# Patient Record
Sex: MALE | Race: WHITE | Employment: FULL TIME
[De-identification: names, ages, dates, MRNs, and addresses within clinical notes are randomized per-mention and may not be internally consistent; named-entity substitution may affect disease eponyms.]

---

## 2021-09-01 ENCOUNTER — NURSE TRIAGE (OUTPATIENT)
Dept: OTHER | Facility: CLINIC | Age: 41
End: 2021-09-01
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
## 2022-06-27 ENCOUNTER — APPOINTMENT (OUTPATIENT)
Dept: CT IMAGING | Age: 42
End: 2022-06-27
Attending: EMERGENCY MEDICINE
Payer: COMMERCIAL

## 2022-06-27 ENCOUNTER — HOSPITAL ENCOUNTER (OUTPATIENT)
Age: 42
Setting detail: OBSERVATION
Discharge: HOME | End: 2022-06-28
Attending: EMERGENCY MEDICINE | Admitting: INTERNAL MEDICINE
Payer: COMMERCIAL

## 2022-06-27 DIAGNOSIS — R11.2 NAUSEA AND VOMITING, UNSPECIFIED VOMITING TYPE: ICD-10-CM

## 2022-06-27 DIAGNOSIS — R00.0 TACHYCARDIA: Primary | ICD-10-CM

## 2022-06-27 DIAGNOSIS — R10.13 ABDOMINAL PAIN, EPIGASTRIC: ICD-10-CM

## 2022-06-27 DIAGNOSIS — R73.9 HYPERGLYCEMIA: ICD-10-CM

## 2022-06-27 LAB
ALBUMIN SERPL-MCNC: 4.2 G/DL (ref 3.5–5)
ALBUMIN/GLOB SERPL: 0.9 {RATIO} (ref 1.1–2.2)
ALP SERPL-CCNC: 84 U/L (ref 45–117)
ALT SERPL-CCNC: 27 U/L (ref 12–78)
ANION GAP SERPL CALC-SCNC: 17 MMOL/L (ref 5–15)
AST SERPL-CCNC: 10 U/L (ref 15–37)
BILIRUB SERPL-MCNC: 2.1 MG/DL (ref 0.2–1)
BUN SERPL-MCNC: 24 MG/DL (ref 6–20)
BUN/CREAT SERPL: 19 (ref 12–20)
CALCIUM SERPL-MCNC: 10.1 MG/DL (ref 8.5–10.1)
CHLORIDE SERPL-SCNC: 100 MMOL/L (ref 97–108)
CO2 SERPL-SCNC: 22 MMOL/L (ref 21–32)
CREAT SERPL-MCNC: 1.27 MG/DL (ref 0.7–1.3)
ERYTHROCYTE [DISTWIDTH] IN BLOOD BY AUTOMATED COUNT: 13.6 % (ref 11.5–14.5)
GLOBULIN SER CALC-MCNC: 4.5 G/DL (ref 2–4)
GLUCOSE SERPL-MCNC: 233 MG/DL (ref 65–100)
HCT VFR BLD AUTO: 55.8 % (ref 36.6–50.3)
HGB BLD-MCNC: 19 G/DL (ref 12.1–17)
LIPASE SERPL-CCNC: 61 U/L (ref 73–393)
MCH RBC QN AUTO: 29.1 PG (ref 26–34)
MCHC RBC AUTO-ENTMCNC: 34.1 G/DL (ref 30–36.5)
MCV RBC AUTO: 85.6 FL (ref 80–99)
NRBC # BLD: 0 K/UL (ref 0–0.01)
NRBC BLD-RTO: 0 PER 100 WBC
PLATELET # BLD AUTO: 296 K/UL (ref 150–400)
PMV BLD AUTO: 9.8 FL (ref 8.9–12.9)
POTASSIUM SERPL-SCNC: 4.7 MMOL/L (ref 3.5–5.1)
PROT SERPL-MCNC: 8.7 G/DL (ref 6.4–8.2)
RBC # BLD AUTO: 6.52 M/UL (ref 4.1–5.7)
SODIUM SERPL-SCNC: 139 MMOL/L (ref 136–145)
WBC # BLD AUTO: 13.4 K/UL (ref 4.1–11.1)

## 2022-06-27 PROCEDURE — 74011000636 HC RX REV CODE- 636: Performed by: RADIOLOGY

## 2022-06-27 PROCEDURE — 36415 COLL VENOUS BLD VENIPUNCTURE: CPT

## 2022-06-27 PROCEDURE — 83690 ASSAY OF LIPASE: CPT

## 2022-06-27 PROCEDURE — 85027 COMPLETE CBC AUTOMATED: CPT

## 2022-06-27 PROCEDURE — 99285 EMERGENCY DEPT VISIT HI MDM: CPT

## 2022-06-27 PROCEDURE — 96361 HYDRATE IV INFUSION ADD-ON: CPT

## 2022-06-27 PROCEDURE — 80053 COMPREHEN METABOLIC PANEL: CPT

## 2022-06-27 PROCEDURE — 74177 CT ABD & PELVIS W/CONTRAST: CPT

## 2022-06-27 PROCEDURE — 96374 THER/PROPH/DIAG INJ IV PUSH: CPT

## 2022-06-27 RX ORDER — ONDANSETRON 2 MG/ML
4 INJECTION INTRAMUSCULAR; INTRAVENOUS ONCE
Status: COMPLETED | OUTPATIENT
Start: 2022-06-27 | End: 2022-06-28

## 2022-06-27 RX ADMIN — IOPAMIDOL 100 ML: 755 INJECTION, SOLUTION INTRAVENOUS at 23:43

## 2022-06-27 NOTE — Clinical Note
Patient Class[de-identified] OBSERVATION [104]   Type of Bed: Remote Telemetry [29]   Cardiac Monitoring Required?: Yes   Reason for Observation: AGMA   Admitting Diagnosis: High anion gap metabolic acidosis [0486396]   Admitting Physician: Rob Melo [[id number]]   Attending Physician: Rob Melo [[id number]]

## 2022-06-28 VITALS
SYSTOLIC BLOOD PRESSURE: 131 MMHG | HEIGHT: 72 IN | DIASTOLIC BLOOD PRESSURE: 91 MMHG | OXYGEN SATURATION: 95 % | TEMPERATURE: 98.1 F | BODY MASS INDEX: 28.99 KG/M2 | WEIGHT: 214 LBS | RESPIRATION RATE: 23 BRPM | HEART RATE: 94 BPM

## 2022-06-28 PROBLEM — E87.29 HIGH ANION GAP METABOLIC ACIDOSIS: Status: ACTIVE | Noted: 2022-06-28

## 2022-06-28 LAB
AMPHET UR QL SCN: NEGATIVE
ANION GAP SERPL CALC-SCNC: 11 MMOL/L (ref 5–15)
ANION GAP SERPL CALC-SCNC: 13 MMOL/L (ref 5–15)
ANION GAP SERPL CALC-SCNC: 16 MMOL/L (ref 5–15)
APPEARANCE UR: CLEAR
B-OH-BUTYR SERPL-SCNC: >4.42 MMOL/L
BACTERIA URNS QL MICRO: NEGATIVE /HPF
BARBITURATES UR QL SCN: NEGATIVE
BASE DEFICIT BLDV-SCNC: 6.8 MMOL/L
BENZODIAZ UR QL: NEGATIVE
BILIRUB UR QL: NEGATIVE
BUN SERPL-MCNC: 20 MG/DL (ref 6–20)
BUN SERPL-MCNC: 20 MG/DL (ref 6–20)
BUN SERPL-MCNC: 23 MG/DL (ref 6–20)
BUN/CREAT SERPL: 20 (ref 12–20)
BUN/CREAT SERPL: 21 (ref 12–20)
BUN/CREAT SERPL: 21 (ref 12–20)
CALCIUM SERPL-MCNC: 8.9 MG/DL (ref 8.5–10.1)
CALCIUM SERPL-MCNC: 8.9 MG/DL (ref 8.5–10.1)
CALCIUM SERPL-MCNC: 9.8 MG/DL (ref 8.5–10.1)
CANNABINOIDS UR QL SCN: NEGATIVE
CHLORIDE SERPL-SCNC: 104 MMOL/L (ref 97–108)
CHLORIDE SERPL-SCNC: 106 MMOL/L (ref 97–108)
CHLORIDE SERPL-SCNC: 107 MMOL/L (ref 97–108)
CO2 SERPL-SCNC: 18 MMOL/L (ref 21–32)
CO2 SERPL-SCNC: 19 MMOL/L (ref 21–32)
CO2 SERPL-SCNC: 21 MMOL/L (ref 21–32)
COCAINE UR QL SCN: NEGATIVE
COLOR UR: ABNORMAL
CREAT SERPL-MCNC: 0.95 MG/DL (ref 0.7–1.3)
CREAT SERPL-MCNC: 1.02 MG/DL (ref 0.7–1.3)
CREAT SERPL-MCNC: 1.12 MG/DL (ref 0.7–1.3)
D DIMER PPP FEU-MCNC: 0.19 MG/L FEU (ref 0–0.65)
DRUG SCRN COMMENT,DRGCM: NORMAL
EPITH CASTS URNS QL MICRO: ABNORMAL /LPF
EST. AVERAGE GLUCOSE BLD GHB EST-MCNC: 237 MG/DL
GLUCOSE BLD STRIP.AUTO-MCNC: 149 MG/DL (ref 65–117)
GLUCOSE BLD STRIP.AUTO-MCNC: 157 MG/DL (ref 65–117)
GLUCOSE BLD STRIP.AUTO-MCNC: 197 MG/DL (ref 65–117)
GLUCOSE SERPL-MCNC: 157 MG/DL (ref 65–100)
GLUCOSE SERPL-MCNC: 161 MG/DL (ref 65–100)
GLUCOSE SERPL-MCNC: 199 MG/DL (ref 65–100)
GLUCOSE UR STRIP.AUTO-MCNC: >1000 MG/DL
HBA1C MFR BLD: 9.9 % (ref 4–5.6)
HCO3 BLDV-SCNC: 17.7 MMOL/L (ref 23–28)
HGB UR QL STRIP: NEGATIVE
HYALINE CASTS URNS QL MICRO: ABNORMAL /LPF (ref 0–2)
KETONES UR QL STRIP.AUTO: >80 MG/DL
LACTATE SERPL-SCNC: 1.3 MMOL/L (ref 0.4–2)
LEUKOCYTE ESTERASE UR QL STRIP.AUTO: NEGATIVE
MAGNESIUM SERPL-MCNC: 2.3 MG/DL (ref 1.6–2.4)
METHADONE UR QL: NEGATIVE
NITRITE UR QL STRIP.AUTO: NEGATIVE
OPIATES UR QL: NEGATIVE
PCO2 BLDV: 33.1 MMHG (ref 41–51)
PCP UR QL: NEGATIVE
PH BLDV: 7.34 [PH] (ref 7.32–7.42)
PH UR STRIP: 5 [PH] (ref 5–8)
PO2 BLDV: 58 MMHG (ref 25–40)
POTASSIUM SERPL-SCNC: 4.1 MMOL/L (ref 3.5–5.1)
POTASSIUM SERPL-SCNC: 4.3 MMOL/L (ref 3.5–5.1)
POTASSIUM SERPL-SCNC: 4.6 MMOL/L (ref 3.5–5.1)
PROT UR STRIP-MCNC: ABNORMAL MG/DL
RBC #/AREA URNS HPF: ABNORMAL /HPF (ref 0–5)
SAO2 % BLDV: 88.1 % (ref 65–88)
SERVICE CMNT-IMP: ABNORMAL
SODIUM SERPL-SCNC: 138 MMOL/L (ref 136–145)
SODIUM SERPL-SCNC: 138 MMOL/L (ref 136–145)
SODIUM SERPL-SCNC: 139 MMOL/L (ref 136–145)
SP GR UR REFRACTOMETRY: 1.01 (ref 1–1.03)
SPECIMEN TYPE: ABNORMAL
T4 FREE SERPL-MCNC: 1.1 NG/DL (ref 0.8–1.5)
TSH SERPL DL<=0.05 MIU/L-ACNC: 0.45 UIU/ML (ref 0.36–3.74)
UROBILINOGEN UR QL STRIP.AUTO: 1 EU/DL (ref 0.2–1)
WBC URNS QL MICRO: ABNORMAL /HPF (ref 0–4)

## 2022-06-28 PROCEDURE — 83735 ASSAY OF MAGNESIUM: CPT

## 2022-06-28 PROCEDURE — 74011250636 HC RX REV CODE- 250/636: Performed by: INTERNAL MEDICINE

## 2022-06-28 PROCEDURE — 82010 KETONE BODYS QUAN: CPT

## 2022-06-28 PROCEDURE — 74011250636 HC RX REV CODE- 250/636: Performed by: EMERGENCY MEDICINE

## 2022-06-28 PROCEDURE — 83036 HEMOGLOBIN GLYCOSYLATED A1C: CPT

## 2022-06-28 PROCEDURE — 80048 BASIC METABOLIC PNL TOTAL CA: CPT

## 2022-06-28 PROCEDURE — G0378 HOSPITAL OBSERVATION PER HR: HCPCS

## 2022-06-28 PROCEDURE — 74011250637 HC RX REV CODE- 250/637: Performed by: INTERNAL MEDICINE

## 2022-06-28 PROCEDURE — 74011000250 HC RX REV CODE- 250: Performed by: INTERNAL MEDICINE

## 2022-06-28 PROCEDURE — 83605 ASSAY OF LACTIC ACID: CPT

## 2022-06-28 PROCEDURE — 36415 COLL VENOUS BLD VENIPUNCTURE: CPT

## 2022-06-28 PROCEDURE — 81001 URINALYSIS AUTO W/SCOPE: CPT

## 2022-06-28 PROCEDURE — 96372 THER/PROPH/DIAG INJ SC/IM: CPT

## 2022-06-28 PROCEDURE — 93005 ELECTROCARDIOGRAM TRACING: CPT

## 2022-06-28 PROCEDURE — 82962 GLUCOSE BLOOD TEST: CPT

## 2022-06-28 PROCEDURE — 74011636637 HC RX REV CODE- 636/637: Performed by: INTERNAL MEDICINE

## 2022-06-28 PROCEDURE — 86341 ISLET CELL ANTIBODY: CPT

## 2022-06-28 PROCEDURE — 80307 DRUG TEST PRSMV CHEM ANLYZR: CPT

## 2022-06-28 PROCEDURE — 82803 BLOOD GASES ANY COMBINATION: CPT

## 2022-06-28 PROCEDURE — 84439 ASSAY OF FREE THYROXINE: CPT

## 2022-06-28 PROCEDURE — 85379 FIBRIN DEGRADATION QUANT: CPT

## 2022-06-28 PROCEDURE — 96374 THER/PROPH/DIAG INJ IV PUSH: CPT

## 2022-06-28 PROCEDURE — 84443 ASSAY THYROID STIM HORMONE: CPT

## 2022-06-28 RX ORDER — ONDANSETRON 2 MG/ML
4 INJECTION INTRAMUSCULAR; INTRAVENOUS
Status: DISCONTINUED | OUTPATIENT
Start: 2022-06-28 | End: 2022-06-29 | Stop reason: HOSPADM

## 2022-06-28 RX ORDER — LOSARTAN POTASSIUM 50 MG/1
50 TABLET ORAL ONCE
Status: DISCONTINUED | OUTPATIENT
Start: 2022-06-28 | End: 2022-06-28

## 2022-06-28 RX ORDER — INSULIN LISPRO 100 [IU]/ML
INJECTION, SOLUTION INTRAVENOUS; SUBCUTANEOUS
Status: DISCONTINUED | OUTPATIENT
Start: 2022-06-28 | End: 2022-06-28

## 2022-06-28 RX ORDER — METOPROLOL TARTRATE 25 MG/1
12.5 TABLET, FILM COATED ORAL EVERY 12 HOURS
Qty: 30 TABLET | Refills: 0 | Status: SHIPPED | OUTPATIENT
Start: 2022-06-28 | End: 2022-07-28

## 2022-06-28 RX ORDER — SODIUM CHLORIDE 0.9 % (FLUSH) 0.9 %
5-40 SYRINGE (ML) INJECTION EVERY 8 HOURS
Status: DISCONTINUED | OUTPATIENT
Start: 2022-06-28 | End: 2022-06-29 | Stop reason: HOSPADM

## 2022-06-28 RX ORDER — NEEDLES, SAFETY 22GX1 1/2"
NEEDLE, DISPOSABLE MISCELLANEOUS
Qty: 1 EACH | Refills: 2 | Status: SHIPPED | OUTPATIENT
Start: 2022-06-28 | End: 2022-06-28

## 2022-06-28 RX ORDER — DULAGLUTIDE 0.75 MG/.5ML
0.75 INJECTION, SOLUTION SUBCUTANEOUS
Qty: 1 EACH | Refills: 0 | Status: SHIPPED
Start: 2022-06-28 | End: 2022-06-29

## 2022-06-28 RX ORDER — DEXTROSE MONOHYDRATE 100 MG/ML
250 INJECTION, SOLUTION INTRAVENOUS ONCE
Status: DISCONTINUED | OUTPATIENT
Start: 2022-06-28 | End: 2022-06-28

## 2022-06-28 RX ORDER — SODIUM CHLORIDE 0.9 % (FLUSH) 0.9 %
5-40 SYRINGE (ML) INJECTION AS NEEDED
Status: DISCONTINUED | OUTPATIENT
Start: 2022-06-28 | End: 2022-06-29 | Stop reason: HOSPADM

## 2022-06-28 RX ORDER — ONDANSETRON 4 MG/1
4 TABLET, ORALLY DISINTEGRATING ORAL
Qty: 30 TABLET | Refills: 0 | Status: SHIPPED | OUTPATIENT
Start: 2022-06-28

## 2022-06-28 RX ORDER — INSULIN GLARGINE 100 [IU]/ML
19 INJECTION, SOLUTION SUBCUTANEOUS
Qty: 5.7 ML | Refills: 0 | Status: SHIPPED | OUTPATIENT
Start: 2022-06-28 | End: 2022-06-28

## 2022-06-28 RX ORDER — ENOXAPARIN SODIUM 100 MG/ML
40 INJECTION SUBCUTANEOUS EVERY 24 HOURS
Status: DISCONTINUED | OUTPATIENT
Start: 2022-06-28 | End: 2022-06-29 | Stop reason: HOSPADM

## 2022-06-28 RX ORDER — INSULIN GLARGINE 100 [IU]/ML
0.2 INJECTION, SOLUTION SUBCUTANEOUS DAILY
Status: DISCONTINUED | OUTPATIENT
Start: 2022-06-29 | End: 2022-06-28

## 2022-06-28 RX ORDER — LOSARTAN POTASSIUM 50 MG/1
50 TABLET ORAL EVERY EVENING
COMMUNITY
End: 2022-06-28

## 2022-06-28 RX ORDER — DULAGLUTIDE 0.75 MG/.5ML
0.75 INJECTION, SOLUTION SUBCUTANEOUS
COMMUNITY
End: 2022-06-28

## 2022-06-28 RX ORDER — INSULIN LISPRO 100 [IU]/ML
INJECTION, SOLUTION INTRAVENOUS; SUBCUTANEOUS
Status: DISCONTINUED | OUTPATIENT
Start: 2022-06-28 | End: 2022-06-29 | Stop reason: HOSPADM

## 2022-06-28 RX ORDER — METFORMIN HYDROCHLORIDE 1000 MG/1
1000 TABLET ORAL 2 TIMES DAILY WITH MEALS
Qty: 60 TABLET | Refills: 0 | Status: SHIPPED
Start: 2022-06-28 | End: 2022-07-28

## 2022-06-28 RX ORDER — MAGNESIUM SULFATE 100 %
4 CRYSTALS MISCELLANEOUS AS NEEDED
Status: DISCONTINUED | OUTPATIENT
Start: 2022-06-28 | End: 2022-06-29 | Stop reason: HOSPADM

## 2022-06-28 RX ORDER — SODIUM CHLORIDE 9 MG/ML
150 INJECTION, SOLUTION INTRAVENOUS CONTINUOUS
Status: DISCONTINUED | OUTPATIENT
Start: 2022-06-28 | End: 2022-06-29 | Stop reason: HOSPADM

## 2022-06-28 RX ORDER — LOSARTAN POTASSIUM 25 MG/1
25 TABLET ORAL DAILY
Qty: 30 TABLET | Refills: 0 | Status: SHIPPED | OUTPATIENT
Start: 2022-06-29 | End: 2022-06-28

## 2022-06-28 RX ORDER — SIMVASTATIN 20 MG/1
20 TABLET, FILM COATED ORAL
COMMUNITY
End: 2022-06-28

## 2022-06-28 RX ORDER — HYDROCODONE BITARTRATE AND ACETAMINOPHEN 5; 325 MG/1; MG/1
1 TABLET ORAL
Status: DISCONTINUED | OUTPATIENT
Start: 2022-06-28 | End: 2022-06-29 | Stop reason: HOSPADM

## 2022-06-28 RX ORDER — METOPROLOL TARTRATE 25 MG/1
25 TABLET, FILM COATED ORAL EVERY 12 HOURS
Qty: 60 TABLET | Refills: 0 | Status: SHIPPED | OUTPATIENT
Start: 2022-06-28 | End: 2022-06-28

## 2022-06-28 RX ORDER — INSULIN GLARGINE 100 [IU]/ML
0.2 INJECTION, SOLUTION SUBCUTANEOUS DAILY
Status: DISCONTINUED | OUTPATIENT
Start: 2022-06-28 | End: 2022-06-29 | Stop reason: HOSPADM

## 2022-06-28 RX ORDER — NALOXONE HYDROCHLORIDE 0.4 MG/ML
0.4 INJECTION, SOLUTION INTRAMUSCULAR; INTRAVENOUS; SUBCUTANEOUS AS NEEDED
Status: DISCONTINUED | OUTPATIENT
Start: 2022-06-28 | End: 2022-06-29 | Stop reason: HOSPADM

## 2022-06-28 RX ORDER — METFORMIN HYDROCHLORIDE 1000 MG/1
1000 TABLET ORAL 2 TIMES DAILY
COMMUNITY
End: 2022-06-28

## 2022-06-28 RX ORDER — METOPROLOL TARTRATE 25 MG/1
25 TABLET, FILM COATED ORAL EVERY 12 HOURS
Status: DISCONTINUED | OUTPATIENT
Start: 2022-06-28 | End: 2022-06-29 | Stop reason: HOSPADM

## 2022-06-28 RX ORDER — LOSARTAN POTASSIUM 50 MG/1
25 TABLET ORAL DAILY
Status: DISCONTINUED | OUTPATIENT
Start: 2022-06-29 | End: 2022-06-29 | Stop reason: HOSPADM

## 2022-06-28 RX ADMIN — SODIUM CHLORIDE 150 ML/HR: 9 INJECTION, SOLUTION INTRAVENOUS at 17:15

## 2022-06-28 RX ADMIN — SODIUM CHLORIDE 150 ML/HR: 9 INJECTION, SOLUTION INTRAVENOUS at 10:11

## 2022-06-28 RX ADMIN — SODIUM CHLORIDE 1000 ML: 9 INJECTION, SOLUTION INTRAVENOUS at 02:42

## 2022-06-28 RX ADMIN — INSULIN LISPRO 2 UNITS: 100 INJECTION, SOLUTION INTRAVENOUS; SUBCUTANEOUS at 16:32

## 2022-06-28 RX ADMIN — SODIUM CHLORIDE, PRESERVATIVE FREE 10 ML: 5 INJECTION INTRAVENOUS at 15:52

## 2022-06-28 RX ADMIN — SODIUM CHLORIDE 1000 ML: 9 INJECTION, SOLUTION INTRAVENOUS at 08:19

## 2022-06-28 RX ADMIN — ONDANSETRON 4 MG: 2 INJECTION INTRAMUSCULAR; INTRAVENOUS at 00:06

## 2022-06-28 RX ADMIN — SODIUM CHLORIDE 1000 ML: 9 INJECTION, SOLUTION INTRAVENOUS at 00:06

## 2022-06-28 RX ADMIN — METOPROLOL TARTRATE 25 MG: 25 TABLET, FILM COATED ORAL at 12:52

## 2022-06-28 RX ADMIN — INSULIN LISPRO 2 UNITS: 100 INJECTION, SOLUTION INTRAVENOUS; SUBCUTANEOUS at 08:19

## 2022-06-28 RX ADMIN — ENOXAPARIN SODIUM 40 MG: 100 INJECTION SUBCUTANEOUS at 08:19

## 2022-06-28 RX ADMIN — INSULIN LISPRO 2 UNITS: 100 INJECTION, SOLUTION INTRAVENOUS; SUBCUTANEOUS at 11:40

## 2022-06-28 RX ADMIN — SODIUM CHLORIDE, PRESERVATIVE FREE 10 ML: 5 INJECTION INTRAVENOUS at 08:19

## 2022-06-28 RX ADMIN — SODIUM CHLORIDE 1000 ML: 9 INJECTION, SOLUTION INTRAVENOUS at 15:51

## 2022-06-28 RX ADMIN — INSULIN GLARGINE 19 UNITS: 100 INJECTION, SOLUTION SUBCUTANEOUS at 16:33

## 2022-06-28 NOTE — DISCHARGE INSTRUCTIONS
HOSPITALIST DISCHARGE INSTRUCTIONS  NAME: Raymond Inman   :  1980   MRN:  [id number]     Date/Time:  2022 6:24 PM    ADMIT DATE: 2022     DISCHARGE DATE: 2022     ADMITTING DIAGNOSIS:  Dehydration   Intractable nausea vomiting   AGMA     DISCHARGE DIAGNOSIS:  As above     MEDICATIONS:     · It is important that you take the medication exactly as they are prescribed. · Keep your medication in the bottles provided by the pharmacist and keep a list of the medication names, dosages, and times to be taken in your wallet. · Do not take other medications without consulting your doctor. Pain Management: per above medications    What to do at Home    Recommended diet:  Diabetic Diet    Recommended activity: Activity as tolerated    If you experience any of the following symptoms then please call your primary care physician or return to the emergency room if you cannot get hold of your doctor:  Fever, chills, nausea, vomiting, diarrhea, change in mentation, falling, bleeding, shortness of breath, chest pain     Information obtained by :  I understand that if any problems occur once I am at home I am to contact my physician. I understand and acknowledge receipt of the instructions indicated above.                                                                                                                                            Physician's or R.N.'s Signature                                                                  Date/Time                                                                                                                                              Patient or Representative Signature                                                          Date/Time

## 2022-06-28 NOTE — PROGRESS NOTES
[facility]: MED RECONCILIATION    Comments/Recommendations:   Patient alert and oriented for medication reconciliation and knowledgeable regarding medications. Patient saw outpatient endocrinologist on Friday 6/24/22 and started new medications of Trulicity and Jardiance. Confirmed NDKA and preferred pharmacy as [facility]. Medications added:     Trulicity   Jardiance  Losartan  Metformin  Simvastatin     Medications removed:    Amlodipine  Clonidine  HCTZ  Lisinopril  Metoprolol succinate     Medications adjusted:    none    Information obtained from: patient, Rx query    Allergies: Patient has no known allergies. Prior to Admission Medications:     Medication Documentation Review Audit       Reviewed by Carlo Torres (Pharmacist) on 06/28/22 at 0840      Medication Sig Documenting Provider Last Dose Status Taking?   dulaglutide (Trulicity) 6.47 BU/5.3 mL sub-q pen 0.75 mg by SubCUTAneous route Every Friday. Inject 0.75mg x 2 weeks and then increase to 1.5mg weekly Provider, Historical  Active Yes           Med Note (CARLO MONAE Jun 28, 2022  8:40 AM) New medication, first dose on 6/24/22, due to increase to 1.5mg weekly on 7/8/22   empagliflozin (Jardiance) 25 mg tablet Take 25 mg by mouth every evening. Provider, Historical 6/26/2022 PM Active Yes           Med Note (CARLO MONAE   [place] Jun 28, 2022  8:38 AM) New medication, first dose 6/24/22, has taken 3 doses total.    losartan (COZAAR) 50 mg tablet Take 50 mg by mouth every evening. Provider, Historical 6/26/2022 PM Active Yes   metFORMIN (GLUCOPHAGE) 1,000 mg tablet Take 1,000 mg by mouth two (2) times a day. Provider, Historical 6/27/2022 AM Active Yes   simvastatin (ZOCOR) 20 mg tablet Take 20 mg by mouth nightly.  Provider, Historical 6/26/2022 PM Active Yes                    Thank you,   Carlo oMnae, PharmD, BCPS   Contact: [phone]

## 2022-06-28 NOTE — PROGRESS NOTES
Verbal shift change report given to FITO Rodriguez (oncoming nurse) by FITO Quintanilla RN (offgoing nurse). Report included the following information SBAR, Kardex, Intake/Output, MAR and Recent Results.

## 2022-06-28 NOTE — PROGRESS NOTES
6/28/2022  4:05 PM  Case management note    Reason for Admission:  High anion gap    Patient came to ED for abdominal pain, nausea and vomiting. Patient is , independent with ADL's and drives. Patient has history of HTN and DM. [facility] [address]  OBS educated, letter signed and placed in chart                     RUR Score:          low           Plan for utilizing home health:      Patient is independent and will most likely not qualify for home health services    PCP: First and Last name:  None   No current PCP     Name of Practice:    Are you a current patient: Yes/No:    Approximate date of last visit:    Can you participate in a virtual visit with your PCP:                     Current Advanced Directive/Advance Care Plan: Full Code NO AD      Healthcare Decision Maker:   Delroy Barnes spouse                              Transition of Care Plan:              1. Home with family assistance  2. PCP will need set up  3. AD planning  4.  CM to follow for discharge needs    Care Management Interventions  PCP Verified by CM: No (no PCP)  Support Systems: Spouse/Significant Other  Confirm Follow Up Transport: Family  The Plan for Transition of Care is Related to the Following Treatment Goals : high anion gap  Discharge Location  Patient Expects to be Discharged to[de-identified] Home with family assistance  Marni Colon

## 2022-06-28 NOTE — ED TRIAGE NOTES
Pt presents to ED c/o constant abd pain, nausea and vomiting. Saw endocrinologist last week and was started on new meds: Losartan 73BZ, trulicity . 75, metformin 1000mg BID, simvastatin 20mg q day and jardiance 20mg q day. Newly dx of type 2 DM. Hx of HTN. Pt  during triage. Pt endorses hx of tachycardia, states HR around 100 is normal for him but current HR is elevated.

## 2022-06-28 NOTE — ED PROVIDER NOTES
42-year-old male with history of DM2, hypertension who presents the ED with complaints of abdominal pain, nausea, vomiting. Reports running out of metformin about a month ago and was told by [facility] that they would not refill his medication and that he needed to be seen by an endocrinologist.  He was seen by endocrinology (Dr. Suarez November) on Friday, 6/24 and was given a refill of his metformin 1000 mg twice daily, losartan 50 mg daily, simvastatin 20 mg daily. He was started on Jardiance 20 mg daily and Trulicity 4.78 mg SC q week. He took his medication Friday and Saturday. Starting Saturday evening, he began having moderate, constant, sharp/pressure in his epigastrium associated with nausea and vomiting with any oral intake. He denies fever, chills, chest pain, shortness of breath, cough, diarrhea, dysuria. Past Medical History:   Diagnosis Date    Hypertension        No past surgical history on file.       Family History:   Problem Relation Age of Onset    Diabetes Father     Hypertension Father     Hypertension Mother     Diabetes Mother     Heart Disease Neg Hx        Social History     Socioeconomic History    Marital status:      Spouse name: Not on file    Number of children: Not on file    Years of education: Not on file    Highest education level: Not on file   Occupational History    Not on file   Tobacco Use    Smoking status: Never Smoker    Smokeless tobacco: Not on file   Substance and Sexual Activity    Alcohol use: No     Alcohol/week: 0.0 standard drinks    Drug use: Not on file    Sexual activity: Not on file   Other Topics Concern    Not on file   Social History Narrative    ** Merged History Encounter **          Social Determinants of Health     Financial Resource Strain:     Difficulty of Paying Living Expenses: Not on file   Food Insecurity:     Worried About [address] in the Last Year: Not on file    [address] in the Last Year: Not on file   Transportation Needs:     Lack of Transportation (Medical): Not on file    Lack of Transportation (Non-Medical): Not on file   Physical Activity:     Days of Exercise per Week: Not on file    Minutes of Exercise per Session: Not on file   Stress:     Feeling of Stress : Not on file   Social Connections:     Frequency of Communication with Friends and Family: Not on file    Frequency of Social Gatherings with Friends and Family: Not on file    Attends Jainism Services: Not on file    Active Member of [address] [place] [address] [employer] or Organizations: Not on file    Attends Club or Organization Meetings: Not on file    Marital Status: Not on file   Intimate Partner Violence:     Fear of Current or Ex-Partner: Not on file    Emotionally Abused: Not on file    Physically Abused: Not on file    Sexually Abused: Not on file   Housing Stability:     Unable to Pay for Housing in the Last Year: Not on file    Number of [place] in the Last Year: Not on file    Unstable Housing in the Last Year: Not on file         ALLERGIES: Patient has no known allergies. Review of Systems   Constitutional: Negative for chills and fever. HENT: Negative for sore throat and trouble swallowing. Eyes: Negative for pain and visual disturbance. Respiratory: Negative for cough and shortness of breath. Cardiovascular: Negative for chest pain and palpitations. Gastrointestinal: Positive for abdominal pain, nausea and vomiting. Negative for diarrhea. Endocrine: Negative for polydipsia, polyphagia and polyuria. Genitourinary: Negative for dysuria and hematuria. Musculoskeletal: Negative for back pain and neck pain. Skin: Negative for color change and pallor. Neurological: Negative for syncope and headaches. Psychiatric/Behavioral: Negative for confusion and suicidal ideas.        Vitals:    06/27/22 2141   BP: (!) 138/92   Pulse: (!) 149   Resp: 16   Temp: 98.3 °F (36.8 °C)   SpO2: 95%   Weight: 97.1 kg (214 lb) Height: 6' (1.829 m)            Physical Exam  Vitals and nursing note reviewed. Constitutional:       Appearance: He is well-developed and normal weight. HENT:      Head: Normocephalic. Mouth/Throat:      Mouth: Mucous membranes are moist.   Cardiovascular:      Rate and Rhythm: Tachycardia present. Heart sounds: Normal heart sounds. Pulmonary:      Breath sounds: Normal breath sounds. Abdominal:      General: There is no distension. Palpations: Abdomen is soft. Tenderness: There is abdominal tenderness in the epigastric area. Skin:     General: Skin is warm and dry. Neurological:      Mental Status: He is oriented to person, place, and time. Psychiatric:         Mood and Affect: Mood normal.          MDM  Number of Diagnoses or Management Options  Abdominal pain, epigastric  Hyperglycemia  Nausea and vomiting, unspecified vomiting type  Tachycardia  Diagnosis management comments: DDx:  Abdominal pain, nausea, vomiting, dehydration, hyperglycemia, DKA, HHS, medication side effect    CT ABD PELV W CONT    Result Date: 6/28/2022  No bowel obstruction, ileus or perforation. No intra-abdominal abscess.     Recent Results (from the past 12 hour(s))  -CBC W/O DIFF:   Collection Time: 06/27/22 10:44 PM       Result                      Value             Ref Range           WBC                         13.4 (H)          4.1 - 11.1 K*       RBC                         6.52 (H)          4.10 - 5.70 *       HGB                         19.0 (H)          12.1 - 17.0 *       HCT                         55.8 (H)          36.6 - 50.3 %       MCV                         85.6              80.0 - 99.0 *       MCH                         29.1              26.0 - 34.0 *       MCHC                        34.1              30.0 - 36.5 *       RDW                         13.6              11.5 - 14.5 %       PLATELET                    296               150 - 400 K/*       MPV                         9.8 8.9 - 12.9 FL       NRBC                        0.0               0  WBC       ABSOLUTE NRBC               0.00              0.00 - 9.87 *  -METABOLIC PANEL, COMPREHENSIVE:   Collection Time: 06/27/22 10:44 PM       Result                      Value             Ref Range           Sodium                      139               136 - 145 mm*       Potassium                   4.7               3.5 - 5.1 mm*       Chloride                    100               97 - 108 mmo*       CO2                         22                21 - 32 mmol*       Anion gap                   17 (H)            5 - 15 mmol/L       Glucose                     233 (H)           65 - 100 mg/*       BUN                         24 (H)            6 - 20 MG/DL        Creatinine                  1.27              0.70 - 1.30 *       BUN/Creatinine ratio        19                12 - 20             GFR est AA                  >60               >60 ml/min/1*       GFR est non-AA              >60               >60 ml/min/1*       Calcium                     10.1              8.5 - 10.1 M*       Bilirubin, total            2.1 (H)           0.2 - 1.0 MG*       ALT (SGPT)                  27                12 - 78 U/L         AST (SGOT)                  10 (L)            15 - 37 U/L         Alk.  phosphatase            84                45 - 117 U/L        Protein, total              8.7 (H)           6.4 - 8.2 g/*       Albumin                     4.2               3.5 - 5.0 g/*       Globulin                    4.5 (H)           2.0 - 4.0 g/*       A-G Ratio                   0.9 (L)           1.1 - 2.2      -LIPASE:   Collection Time: 06/27/22 10:44 PM       Result                      Value             Ref Range           Lipase                      61 (L)            73 - 393 U/L   -URINALYSIS W/ RFLX MICROSCOPIC:   Collection Time: 06/28/22  1:28 AM       Result                      Value             Ref Range           Color YELLOW/STRAW                          Appearance                  CLEAR             CLEAR               Specific gravity            1.015             1.003 - 1.03*       pH (UA)                     5.0               5.0 - 8.0           Protein                     TRACE (A)         NEG mg/dL           Glucose                     >1,000 (A)        NEG mg/dL           Ketone                      >80 (A)           NEG mg/dL           Bilirubin                   Negative          NEG                 Blood                       Negative          NEG                 Urobilinogen                1.0               0.2 - 1.0 EU*       Nitrites                    Negative          NEG                 Leukocyte Esterase          Negative          NEG                 WBC                         0-4               0 - 4 /hpf          RBC                         0-5               0 - 5 /hpf          Epithelial cells            FEW               FEW /lpf            Bacteria                    Negative          NEG /hpf            Hyaline cast                2-5               0 - 2 /lpf     -METABOLIC PANEL, BASIC:   Collection Time: 06/28/22  2:36 AM       Result                      Value             Ref Range           Sodium                      138               136 - 145 mm*       Potassium                   4.6               3.5 - 5.1 mm*       Chloride                    104               97 - 108 mmo*       CO2                         18 (L)            21 - 32 mmol*       Anion gap                   16 (H)            5 - 15 mmol/L       Glucose                     199 (H)           65 - 100 mg/*       BUN                         23 (H)            6 - 20 MG/DL        Creatinine                  1.12              0.70 - 1.30 *       BUN/Creatinine ratio        21 (H)            12 - 20             GFR est AA                  >60               >60 ml/min/1*       GFR est non-AA              >60               >60 ml/min/1*       Calcium                     9.8               8.5 - 10.1 M*  -LACTIC ACID:   Collection Time: 06/28/22  2:36 AM       Result                      Value             Ref Range           Lactic acid                 1.3               0.4 - 2.0 MM*  -POC VENOUS BLOOD GAS:   Collection Time: 06/28/22  2:52 AM       Result                      Value             Ref Range           pH, venous (POC)            7.34              7.32 - 7.42         pCO2, venous (POC)          33.1 (L)          41 - 51 MMHG        pO2, venous (POC)           58 (H)            25 - 40 mmHg        HCO3, venous (POC)          17.7 (L)          23.0 - 28.0 *       sO2, venous (POC)           88.1 (H)          65 - 88 %           Base deficit, venous (*     6.8               mmol/L              Specimen type (POC)         VENOUS BLOOD                          Performed by                Lu Osborne                          Critical value read Khanh Sosa                               [id number] -60-year-old male with type 2 diabetes, hypertension who presents the ED with complaints of abdominal pain, nausea, vomiting in setting of being off his diet Beatties medications for 1 month and restarting 2 days ago. Found to have a blood sugar of 233, bicarb 18, and anion gap of 17. On VBG, pH 7.34 with PCO2 of 33. Patient with heart rate in the 120s-140s. Sinus tachycardia on EKG. Beta hydroxybutyrate unable to be run at this time. Patient with ketones in his urine. This may be mild DKA versus severe dehydration. Patient has been given 2 L of fluids and heart rate has come down to 128. Abdominal CT was unremarkable. Plan to hospitalize for tachycardia, dehydration, hyperglycemia.        Amount and/or Complexity of Data Reviewed  Clinical lab tests: reviewed  Tests in the radiology section of CPT®: reviewed  Tests in the medicine section of CPT®: reviewed    Risk of Complications, Morbidity, and/or Mortality  Presenting problems: moderate  Diagnostic procedures: moderate  Management options: moderate      ED Course as of 06/27/22 2323 Mon Jun 27, 2022   2319 Anion gap(!): 17 [GH]      ED Course User Index  [GH] Terrell Cisneros MD       EKG    Date/Time: 6/28/2022 12:14 AM  Performed by: Terrell Cisneros MD  Authorized by: Terrell Cisneros MD     ECG reviewed by ED Physician in the absence of a cardiologist: yes    Previous ECG:     Previous ECG:  Unavailable  Interpretation:     Interpretation: abnormal    Rate:     ECG rate:  127    ECG rate assessment: tachycardic    Rhythm:     Rhythm: sinus rhythm    Ectopy:     Ectopy: none    QRS:     QRS axis:  Normal  Conduction:     Conduction: normal    ST segments:     ST segments:  Normal  T waves:     T waves: non-specific

## 2022-06-28 NOTE — PROGRESS NOTES
1830: Reviewed discharge instructions with patient. Opportunity for questions provided. Patient verbalized understanding. IV and telemetry removed. Signed copy of paper discharge instructions placed in chart. Pt is waiting on ride. 1900: Bedside shift change report given to Tata Pepper (oncoming nurse) by Shadi Jaime (offgoing nurse). Report included the following information SBAR, Kardex, Intake/Output, MAR and Recent Results.

## 2022-06-29 LAB — GAD65 AB SER-ACNC: <5 U/ML (ref 0–5)

## 2022-06-29 NOTE — DISCHARGE SUMMARY
Physician Discharge Summary     Patient ID:  Nissa Saul  [id number]  39 y.o.  1980    Admit date: 6/28/2022    Discharge date and time: 6/28/2022    Admission Diagnoses: High anion gap metabolic acidosis [C31.9]    Discharge Diagnoses/Hospital Course   Mr. Fern Gastelum is as 40 yo male admitted for N/V/ab pain for several days with decrease PO intake after initiation of Trulicity, Jardiance and metformin. Ab CT without acute process. Labs revealed an [address] but with a BG only of 200. Lactate was WNL. Rapidly improved with IVF's. Suspect 2/2 Jardiance rather than DKA. Suspect that IVVD, poor PO intake due to N/V also contributed. A1c was noted to be 9.9. Attempted to call pt's endocrinologist but was on vacation during this time. Discussed with pt and will hold Jardiance. Advised to restart metforming 48 hours post contrast at lower dosing and uptitrate as tolerated. Similarly next dose of Trulicity scheduled for this week. Pt will trial. Given Rx for zofran in the event of recurrence of N/V at which time a different agent will need to be used. Did sent a MARINA Ab but this was pending at time of discharge. BP was elevated. Pt started on low dose metoprolol considering elevated HR chronically and BP. Also resumed ARB at lower dose at discharge     PCP: None     Consults: None    Discharge Exam:  Please refer to progress note from today. Disposition: home    Patient Instructions:   Current Discharge Medication List      START taking these medications    Details   metoprolol tartrate (LOPRESSOR) 25 mg tablet Take 0.5 Tablets by mouth every twelve (12) hours for 30 days. Qty: 30 Tablet, Refills: 0      ondansetron (ZOFRAN ODT) 4 mg disintegrating tablet Take 1 Tablet by mouth every eight (8) hours as needed for Nausea or Vomiting.   Qty: 30 Tablet, Refills: 0         CONTINUE these medications which have CHANGED    Details   dulaglutide (Trulicity) 1.25 DQ/2.9 mL sub-q pen 0.5 mL by SubCUTAneous route every seven (7) days for 1 day. Qty: 1 Each, Refills: 0      metFORMIN (GLUCOPHAGE) 1,000 mg tablet Take 1 Tablet by mouth two (2) times daily (with meals) for 30 days.   Qty: 60 Tablet, Refills: 0         STOP taking these medications       empagliflozin (Jardiance) 25 mg tablet Comments:   Reason for Stopping:         losartan (COZAAR) 50 mg tablet Comments:   Reason for Stopping:         simvastatin (ZOCOR) 20 mg tablet Comments:   Reason for Stopping:             Activity: Activity as tolerated  Diet: Diabetic Diet  Wound Care: None needed    Follow-up with Dr. Gabriel Reason as soon as able   Approximate time spent in patient care on day of discharge: 35 minutes     Signed:  Clementina Russell MD  6/28/2022  8:57 PM

## 2022-06-29 NOTE — H&P
[address]  [phone]    Admission History and Physical      NAME:  Pancho Huggins   :   1980   MRN:  [id number]     PCP:  None     Date/Time:  2022         Subjective:     CHIEF COMPLAINT: \"I felt so bad\"     HISTORY OF PRESENT ILLNESS:     Mr. Verónica Curry is a 39 y.o.  male with PMH of HTN and DM admitted for [place] [address]. Per pt had recently been started on Trulicity, Jardiance and metformin by his endocrinologist at the same time. The following day started having severe nausea/vomiting and ab pain. Unable to tolerate any PO. Labs in the ED revealed AGMA but BG only 233. Past Medical History:   Diagnosis Date    Hypertension         No past surgical history on file.    Denies     Family History   Problem Relation Age of Onset    Diabetes Father     Hypertension Father     Hypertension Mother     Diabetes Mother     Heart Disease Neg Hx         No Known Allergies     Prior to Admission medications    Medication Sig Start Date End Date Taking? Authorizing Provider   metoprolol tartrate (LOPRESSOR) 25 mg tablet Take 0.5 Tablets by mouth every twelve (12) hours for 30 days. 22 Yes Lauryn Walden MD   dulaglutide (Trulicity) 2.83 DI/7.9 mL sub-q pen 0.5 mL by SubCUTAneous route every seven (7) days for 1 day. 22 Yes Lauryn Walden MD   metFORMIN (GLUCOPHAGE) 1,000 mg tablet Take 1 Tablet by mouth two (2) times daily (with meals) for 30 days. 22 Yes Lauryn Walden MD   ondansetron (ZOFRAN ODT) 4 mg disintegrating tablet Take 1 Tablet by mouth every eight (8) hours as needed for Nausea or Vomiting. 22  Yes Lauryn Walden MD         Review of Systems:  (bold if positive, if negative)    Gen:  Eyes:  ENT:  CVS:  Pulm:  GI:    :    MS:  Skin:  Psych:  Endo:    Hem:  Renal:    Neuro:      Ab pain, N/V       Objective:      VITALS:    Vital signs reviewed; most recent are:    Visit Vitals  BP (!) 131/91   Pulse 94   Temp 98.1 °F (36.7 °C)   Resp 23   Ht 6' (1.829 m)   Wt 97.1 kg (214 lb)   SpO2 95%   BMI 29.02 kg/m²     SpO2 Readings from Last 6 Encounters:   06/28/22 95%   07/08/16 98%   07/05/16 93%   07/03/16 96%            Intake/Output Summary (Last 24 hours) at 6/29/2022 0738  Last data filed at 6/28/2022 1850  Gross per 24 hour   Intake 0 ml   Output 2600 ml   Net -2600 ml            Exam:     Physical Exam:    Gen:  Well-developed, well-nourished, in no acute distress  HEENT:  Pink conjunctivae, PERRL, hearing intact to voice, moist mucous membranes  Neck:  Supple, without masses, thyroid non-tender  Resp:  No accessory muscle use, clear breath sounds without wheezes rales or rhonchi  Card:  No murmurs, normal S1, S2 without thrills, bruits or peripheral edema  Abd:  Soft, non-tender, non-distended, normoactive bowel sounds are present, no palpable organomegaly  Lymph:  No cervical adenopathy  Musc:  No cyanosis or clubbing  Skin:  No rashes or ulcers, skin turgor is good  Neuro:  Cranial nerves 3-12 are grossly intact,  strength is 5/5 bilaterally, dorsi / plantarflexion strength is 5/5 bilaterally, follows commands appropriately  Psych:  Alert with good insight. Oriented to person, place, and time       Labs:    Recent Labs     06/27/22  2244   WBC 13.4*   HGB 19.0*   HCT 55.8*        Recent Labs     06/28/22  1636 06/28/22  1236 06/28/22  0809 06/28/22  0236 06/27/22  2244      < >  --    < > 139   K 4.1   < >  --    < > 4.7      < >  --    < > 100   CO2 21   < >  --    < > 22   *   < >  --    < > 233*   BUN 20   < >  --    < > 24*   CREA 1.02   < >  --    < > 1.27   CA 8.9   < >  --    < > 10.1   MG  --   --  2.3  --   --    ALB  --   --   --   --  4.2   ALT  --   --   --   --  27    < > = values in this interval not displayed.      No components found for: GLPOC  No results for input(s): PH, PCO2, PO2, HCO3, FIO2 in the last 72 hours. No results for input(s): INR, INREXT in the last 72 hours. CT ab/pelvis =>   No acute process        Assessment/Plan:     High AG Metabolic Acidosis - suspect 2/2 Jardiance as BG only 200. IVVD, poor PO intake, metformin also likely contributed. Lower suspicion for T1DM  -AG rapidly closed with IVF's alone  -continue aggressive IVF's   -lactate WNL  -hold outpt meds   -check MARINA ab  -beta hydroxybutyrate elevated but unclear significance consider all aforementioned above   -advance diet as tolerate   -serial BMP's     N/V/AB pain - likely 2/2 medications. Started immediately after all meds started   -resolved  -continue IVF's  -ab CT without acute process     DM - type II   -A1c 9.9  -ISS   -BG checks AC TID and qHS   -given weight based lantus for now   -IVF's as above     HTN   -start low dose metoprolol as HR elevated (sinus) and BP elevated   -will also need ARB at d/c; had a cough with lisinopril.  Will resume at discharge if Cr stable     Surrogate decision maker: Wife    Total time spent with patient: [address] discussed with: Patient and Family    Discussed:  Care Plan    Prophylaxis:  Lovenox    Probable Disposition:  Home w/Family           ___________________________________________________    Attending Physician: Thermon Moritz, MD

## 2022-07-01 LAB
ATRIAL RATE: 127 BPM
CALCULATED P AXIS, ECG09: 40 DEGREES
CALCULATED R AXIS, ECG10: 1 DEGREES
CALCULATED T AXIS, ECG11: 65 DEGREES
DIAGNOSIS, 93000: NORMAL
P-R INTERVAL, ECG05: 126 MS
Q-T INTERVAL, ECG07: 312 MS
QRS DURATION, ECG06: 82 MS
QTC CALCULATION (BEZET), ECG08: 453 MS
VENTRICULAR RATE, ECG03: 127 BPM

## 2024-04-24 LAB
CREATININE, EXTERNAL: 0.92
HBA1C MFR BLD HPLC: 7.4 %
TOTAL CHOLESTEROL, EXTERNAL: 150

## 2024-06-09 SDOH — HEALTH STABILITY: PHYSICAL HEALTH: ON AVERAGE, HOW MANY DAYS PER WEEK DO YOU ENGAGE IN MODERATE TO STRENUOUS EXERCISE (LIKE A BRISK WALK)?: 7 DAYS

## 2024-06-09 SDOH — HEALTH STABILITY: PHYSICAL HEALTH: ON AVERAGE, HOW MANY MINUTES DO YOU ENGAGE IN EXERCISE AT THIS LEVEL?: 150+ MIN

## 2024-06-12 ENCOUNTER — OFFICE VISIT (OUTPATIENT)
Age: 44
End: 2024-06-12

## 2024-06-12 VITALS
RESPIRATION RATE: 15 BRPM | WEIGHT: 222.2 LBS | HEART RATE: 130 BPM | TEMPERATURE: 97.7 F | OXYGEN SATURATION: 98 % | SYSTOLIC BLOOD PRESSURE: 145 MMHG | DIASTOLIC BLOOD PRESSURE: 110 MMHG | HEIGHT: 72 IN | BODY MASS INDEX: 30.1 KG/M2

## 2024-06-12 DIAGNOSIS — E11.9 CONTROLLED TYPE 2 DIABETES MELLITUS WITHOUT COMPLICATION, WITHOUT LONG-TERM CURRENT USE OF INSULIN (HCC): ICD-10-CM

## 2024-06-12 DIAGNOSIS — I10 PRIMARY HYPERTENSION: ICD-10-CM

## 2024-06-12 DIAGNOSIS — R00.0 TACHYCARDIA: ICD-10-CM

## 2024-06-12 DIAGNOSIS — I47.10 SVT (SUPRAVENTRICULAR TACHYCARDIA) (HCC): ICD-10-CM

## 2024-06-12 DIAGNOSIS — M79.18 MUSCULOSKELETAL PAIN: ICD-10-CM

## 2024-06-12 DIAGNOSIS — Z76.89 ENCOUNTER TO ESTABLISH CARE: Primary | ICD-10-CM

## 2024-06-12 DIAGNOSIS — G62.9 NEUROPATHY: ICD-10-CM

## 2024-06-12 RX ORDER — CYCLOBENZAPRINE HCL 10 MG
10 TABLET ORAL NIGHTLY PRN
Qty: 30 TABLET | Refills: 0 | Status: SHIPPED | OUTPATIENT
Start: 2024-06-12 | End: 2024-07-12

## 2024-06-12 RX ORDER — AMLODIPINE AND OLMESARTAN MEDOXOMIL 5; 20 MG/1; MG/1
1 TABLET ORAL
COMMUNITY

## 2024-06-12 RX ORDER — DULOXETIN HYDROCHLORIDE 60 MG/1
CAPSULE, DELAYED RELEASE ORAL
COMMUNITY
Start: 2022-08-19

## 2024-06-12 RX ORDER — SIMVASTATIN 20 MG
20 TABLET ORAL NIGHTLY
COMMUNITY

## 2024-06-12 RX ORDER — GLIMEPIRIDE 2 MG/1
TABLET ORAL
COMMUNITY

## 2024-06-12 RX ORDER — METOPROLOL SUCCINATE 25 MG/1
25 TABLET, EXTENDED RELEASE ORAL DAILY
Qty: 30 TABLET | Refills: 5 | Status: SHIPPED | OUTPATIENT
Start: 2024-06-12

## 2024-06-12 RX ORDER — DULOXETIN HYDROCHLORIDE 60 MG/1
CAPSULE, DELAYED RELEASE ORAL
COMMUNITY
Start: 2024-05-24 | End: 2024-06-12 | Stop reason: CLARIF

## 2024-06-12 RX ORDER — BLOOD-GLUCOSE SENSOR
EACH MISCELLANEOUS
COMMUNITY
Start: 2024-06-03

## 2024-06-12 ASSESSMENT — PATIENT HEALTH QUESTIONNAIRE - PHQ9
SUM OF ALL RESPONSES TO PHQ QUESTIONS 1-9: 0
SUM OF ALL RESPONSES TO PHQ9 QUESTIONS 1 & 2: 0
SUM OF ALL RESPONSES TO PHQ QUESTIONS 1-9: 0
2. FEELING DOWN, DEPRESSED OR HOPELESS: NOT AT ALL
1. LITTLE INTEREST OR PLEASURE IN DOING THINGS: NOT AT ALL

## 2024-06-12 NOTE — PROGRESS NOTES
Room:  I have reviewed all needed documentation in preparation for visit. Verified patient by name and date of birth. Rooming procedures conducted per protocol. Reviewed allergies and medications with patient.   Justyn Richard is a 43 y.o. male for New Patient, Neck Pain, and Hypertension     Vitals:    06/12/24 1427   BP: (!) 145/110   Pulse: (!) 130   Resp: 15   Temp: 97.7 °F (36.5 °C)   SpO2: 98%       Patient is here to establish care. He states that he needs some help with his BP. Patient also states that he has a \"kink\" in his left side of his neck that started three weeks ago. Patient states that it hurts to turn his head either direction.  Patient states that his high BP is normal for him. He also states that his HR is normal for him as well.  Patient denies any symptoms of HTN.     Health Maintenance Due   Topic Date Due    Hepatitis B vaccine (1 of 3 - 3-dose series) Never done    COVID-19 Vaccine (1) Never done    Varicella vaccine (1 of 2 - 2-dose childhood series) Never done    Lipids  Never done    Depression Screen  Never done    HIV screen  Never done    Hepatitis C screen  Never done    DTaP/Tdap/Td vaccine (1 - Tdap) Never done    A1C test (Diabetic or Prediabetic)  06/28/2023        \"Have you been to the ER, urgent care clinic since your last visit?  Hospitalized since your last visit?\"    NO    “Have you seen or consulted any other health care providers outside of [facility] since your last visit?”    NO         Mili \"Guillermo\" TIFFANIE Arnold

## 2024-06-12 NOTE — PROGRESS NOTES
Justyn Richard is a 43 y.o. male  Chief Complaint   Patient presents with    New Patient    Neck Pain    Hypertension       Vitals:    06/12/24 1427   BP: (!) 145/110   Pulse: (!) 130   Resp: 15   Temp: 97.7 °F (36.5 °C)   SpO2: 98%          HPI  Mr.Timothy Cruz Richard is a 43 y.o. with history of HTN, HLD, DM presents to [facility] care. He doesn't remember the name of his PCP, he sees Dr. Allen Endocrin[facility], his Diabetes is almost in good control, his last A1c was 7.4 in April. His blood pressure were high recently and came here because of blood pressure elevation, he reports that his baseline heart rate is around 120, today, he has tachycardia, doesn't have palpitation, chest pain or neurologic deficit. He takes gabapentin that improved his peripheral neuropathy. His sleep is ok. In the pastHe was taking losartan, metoprolol and hctz, now he is on amlodipine-olmesartan 5-20. He doesn't have side effect from medication       DM: glucose reading-glc 120, A1c7.4, changed diet and exercise after diagnosis, A1c at diagnosis was 15.     HLD: on simvastatin    Neuropathy: duloxetine for tingling sensation       .  Past Medical History:   Diagnosis Date    Erectile dysfunction     Hypertension     Neuropathy     Type 2 diabetes mellitus without complication (HCC)           ROS  Review of Systems   Constitutional:  Negative for fever.   Respiratory:  Negative for cough and shortness of breath.    Cardiovascular:  Negative for chest pain and palpitations.   Neurological:  Negative for headaches.   Psychiatric/Behavioral:  Negative for dysphoric mood.            EXAM  Physical Exam  Vitals and nursing note reviewed.   HENT:      Head: Normocephalic and atraumatic.   Cardiovascular:      Rate and Rhythm: Normal rate and regular rhythm.      Pulses: Normal pulses.      Heart sounds: Normal heart sounds. No murmur heard.  Pulmonary:      Effort: Pulmonary effort is normal. No respiratory distress.      Breath sounds:

## 2024-06-13 PROBLEM — E11.9 CONTROLLED TYPE 2 DIABETES MELLITUS WITHOUT COMPLICATION, WITHOUT LONG-TERM CURRENT USE OF INSULIN (HCC): Status: ACTIVE | Noted: 2024-06-13

## 2024-06-13 ASSESSMENT — ENCOUNTER SYMPTOMS
SHORTNESS OF BREATH: 0
COUGH: 0

## 2024-06-24 SDOH — ECONOMIC STABILITY: TRANSPORTATION INSECURITY
IN THE PAST 12 MONTHS, HAS LACK OF TRANSPORTATION KEPT YOU FROM MEETINGS, WORK, OR FROM GETTING THINGS NEEDED FOR DAILY LIVING?: NO

## 2024-06-24 SDOH — ECONOMIC STABILITY: INCOME INSECURITY: HOW HARD IS IT FOR YOU TO PAY FOR THE VERY BASICS LIKE FOOD, HOUSING, MEDICAL CARE, AND HEATING?: NOT VERY HARD

## 2024-06-24 SDOH — ECONOMIC STABILITY: FOOD INSECURITY: WITHIN THE PAST 12 MONTHS, THE FOOD YOU BOUGHT JUST DIDN'T LAST AND YOU DIDN'T HAVE MONEY TO GET MORE.: NEVER TRUE

## 2024-06-24 SDOH — ECONOMIC STABILITY: HOUSING INSECURITY
IN THE LAST 12 MONTHS, WAS THERE A TIME WHEN YOU DID NOT HAVE A STEADY PLACE TO SLEEP OR SLEPT IN A SHELTER (INCLUDING NOW)?: NO

## 2024-06-24 SDOH — ECONOMIC STABILITY: FOOD INSECURITY: WITHIN THE PAST 12 MONTHS, YOU WORRIED THAT YOUR FOOD WOULD RUN OUT BEFORE YOU GOT MONEY TO BUY MORE.: NEVER TRUE

## 2024-06-26 ENCOUNTER — OFFICE VISIT (OUTPATIENT)
Age: 44
End: 2024-06-26
Payer: COMMERCIAL

## 2024-06-26 VITALS
TEMPERATURE: 98.1 F | BODY MASS INDEX: 29.8 KG/M2 | DIASTOLIC BLOOD PRESSURE: 85 MMHG | HEART RATE: 100 BPM | WEIGHT: 220 LBS | SYSTOLIC BLOOD PRESSURE: 120 MMHG | RESPIRATION RATE: 18 BRPM | HEIGHT: 72 IN | OXYGEN SATURATION: 97 %

## 2024-06-26 DIAGNOSIS — R00.0 TACHYCARDIA: ICD-10-CM

## 2024-06-26 DIAGNOSIS — I10 PRIMARY HYPERTENSION: ICD-10-CM

## 2024-06-26 PROCEDURE — 3079F DIAST BP 80-89 MM HG: CPT | Performed by: INTERNAL MEDICINE

## 2024-06-26 PROCEDURE — 3074F SYST BP LT 130 MM HG: CPT | Performed by: INTERNAL MEDICINE

## 2024-06-26 PROCEDURE — 99213 OFFICE O/P EST LOW 20 MIN: CPT | Performed by: INTERNAL MEDICINE

## 2024-06-26 RX ORDER — METOPROLOL SUCCINATE 50 MG/1
50 TABLET, EXTENDED RELEASE ORAL DAILY
Qty: 90 TABLET | Refills: 1 | Status: SHIPPED | OUTPATIENT
Start: 2024-06-26 | End: 2024-12-23

## 2024-06-26 NOTE — PROGRESS NOTES
Justyn Richard is a 43 y.o. male  Chief Complaint   Patient presents with    Hypertension    Tachycardia     No changes since last visit     Follow-up       Vitals:    06/26/24 1413   BP: 120/85   Pulse:    Resp:    Temp:    SpO2:           HPI  Mr.Timothy Cruz Richard is a 43 y.o.male who has DMII, HTN who presents to clinic for follow up on HTN, sinus tachycardia. He started on metoprolol and feeling fine, his blood pressure is better and his heart rate is around 100. He is an EMT, he has a cardiology appointment. I think the EKG is sinus tachycardia on EKG, but given his recent EKG readings and his resting HR being over 130 before metoprolol,  I recommend a confirmation with Cardiology.         Past Medical History:   Diagnosis Date    Erectile dysfunction     Hypertension     Neuropathy     Type 2 diabetes mellitus without complication (HCC)         ROS  Review of Systems   Constitutional:  Negative for fever.   Respiratory:  Negative for cough and shortness of breath.    Cardiovascular:  Negative for chest pain and palpitations.   Neurological:  Negative for headaches.   Psychiatric/Behavioral:  Negative for dysphoric mood.            EXAM  Physical Exam  Vitals reviewed.   Constitutional:       Appearance: Normal appearance.   HENT:      Head: Normocephalic and atraumatic.   Cardiovascular:      Rate and Rhythm: Normal rate and regular rhythm.      Pulses: Normal pulses.      Heart sounds: Normal heart sounds. No murmur heard.  Pulmonary:      Effort: Pulmonary effort is normal. No respiratory distress.      Breath sounds: Normal breath sounds.   Neurological:      Mental Status: He is alert.   Psychiatric:         Mood and Affect: Mood normal.         Behavior: Behavior normal.         Thought Content: Thought content normal.         Judgment: Judgment normal.         Health Maintenance Due   Topic Date Due    Hepatitis B vaccine (1 of 3 - 3-dose series) Never done    COVID-19 Vaccine (1) Never done

## 2024-06-26 NOTE — PROGRESS NOTES
Justyn Richard is a 43 y.o. male presenting for Hypertension, Tachycardia (No changes since last visit ), and Follow-up      BP (!) 128/90   Pulse 100   Temp 98.1 °F (36.7 °C) (Temporal)   Resp 18   Ht 1.829 m (6')   Wt 99.8 kg (220 lb)   SpO2 97%   BMI 29.84 kg/m²       Current Outpatient Medications   Medication Sig Dispense Refill    DULoxetine (CYMBALTA) 60 MG extended release capsule 1 cap(s) orally daily for 90 days      amLODIPine-olmesartan (SHIVANI) 5-20 MG per tablet Take 1 tablet by mouth Every Day      glimepiride (AMARYL) 2 MG tablet 1 tablet with breakfast or the first main meal of the day Orally Once a day for 90 days      metFORMIN (GLUCOPHAGE) 1000 MG tablet 1 tablet with a meal orally 2 times a day for 90 days      simvastatin (ZOCOR) 20 MG tablet Take 1 tablet by mouth nightly      metoprolol succinate (TOPROL XL) 25 MG extended release tablet Take 1 tablet by mouth daily 30 tablet 5    cyclobenzaprine (FLEXERIL) 10 MG tablet Take 1 tablet by mouth nightly as needed for Muscle spasms 30 tablet 0    Continuous Glucose Sensor (FREESTYLE MALKA 3 SENSOR) MISC  (Patient not taking: Reported on 6/26/2024)       No current facility-administered medications for this visit.        1. \"Have you been to the ER, urgent care clinic since your last visit?  Hospitalized since your last visit?\" No    2. \"Have you seen or consulted any other health care providers outside of the [facility] since your last visit?\" No     3. For patients aged 45-75: Has the patient had a colonoscopy / FIT/ Cologuard? NA - based on age

## 2024-06-27 ASSESSMENT — ENCOUNTER SYMPTOMS
COUGH: 0
SHORTNESS OF BREATH: 0

## 2024-07-18 RX ORDER — METOPROLOL SUCCINATE 25 MG/1
50 TABLET, EXTENDED RELEASE ORAL DAILY
Qty: 180 TABLET | Refills: 0 | Status: SHIPPED | OUTPATIENT
Start: 2024-07-18 | End: 2024-10-16

## 2024-07-29 ENCOUNTER — TELEPHONE (OUTPATIENT)
Age: 44
End: 2024-07-29

## 2024-07-29 ENCOUNTER — OFFICE VISIT (OUTPATIENT)
Age: 44
End: 2024-07-29
Payer: COMMERCIAL

## 2024-07-29 VITALS
OXYGEN SATURATION: 100 % | BODY MASS INDEX: 29.53 KG/M2 | DIASTOLIC BLOOD PRESSURE: 88 MMHG | SYSTOLIC BLOOD PRESSURE: 140 MMHG | WEIGHT: 218 LBS | HEART RATE: 95 BPM | HEIGHT: 72 IN

## 2024-07-29 DIAGNOSIS — I10 ESSENTIAL (PRIMARY) HYPERTENSION: ICD-10-CM

## 2024-07-29 DIAGNOSIS — R00.0 SINUS TACHYCARDIA: Primary | ICD-10-CM

## 2024-07-29 DIAGNOSIS — E78.2 MIXED DYSLIPIDEMIA: ICD-10-CM

## 2024-07-29 PROCEDURE — 3079F DIAST BP 80-89 MM HG: CPT | Performed by: SPECIALIST

## 2024-07-29 PROCEDURE — 99204 OFFICE O/P NEW MOD 45 MIN: CPT | Performed by: SPECIALIST

## 2024-07-29 PROCEDURE — 3077F SYST BP >= 140 MM HG: CPT | Performed by: SPECIALIST

## 2024-07-29 RX ORDER — OLMESARTAN MEDOXOMIL 20 MG/1
20 TABLET ORAL DAILY
Qty: 90 TABLET | Refills: 3 | Status: SHIPPED | OUTPATIENT
Start: 2024-07-29

## 2024-07-29 NOTE — PROGRESS NOTES
Chief Complaint   Patient presents with    Tachycardia    Hypertension     Vitals:    07/29/24 0934   BP: (!) 140/88   Site: Left Upper Arm   Position: Sitting   Pulse: 95   SpO2: 100%   Weight: 98.9 kg (218 lb)   Height: 1.829 m (6')       Chest pain: DENIED     Recent hospital stays: DENIED     Refills: DENIED   
06/27/2022     Lab Results   Component Value Date    WBC 13.4 (H) 06/27/2022    HGB 19.0 (H) 06/27/2022    HCT 55.8 (H) 06/27/2022    MCV 85.6 06/27/2022     06/27/2022     Lab Results   Component Value Date    TSH 0.45 06/28/2022         No results found for: \"CHOL\", \"TRIG\", \"HDL\", \"LDL\", \"VLDL\", \"CHOLHDLRATIO\"          CARDIAC DIAGNOSTICS:     Cardiac Evaluation Includes:  I reviewed the test results below.    EKG 6/28/22 - sinus tach,    EKG 6/12/24 - sinus tach,    I independently viewed and interpreted the test image(s) myself.      ASSESSMENT AND PLAN:     Assessment and Plan:    Sinus tach  - EKG 6/12/24 shows sinus tach () and not atrial flutter as EKG computer interpretation called it   - He feels fine - says resting HR > 100-120 bpm historically   - Get recent labs to review - Done by Dr. 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       Castillo Lau MD, [facility] [place]  [facility]  [address]  [address]  [place], [place]  [address]  [place] [address]  Ph: [phone]   Ph [phone]

## 2024-07-29 NOTE — TELEPHONE ENCOUNTER
----- Message from Castillo Lau MD sent at 7/29/2024 10:03 AM EDT -----  Regarding: 3 day holter  Please mail him a 3 day holter for tachycardia     Thanks  SK

## 2024-08-15 ENCOUNTER — TELEPHONE (OUTPATIENT)
Age: 44
End: 2024-08-15

## 2024-08-15 NOTE — TELEPHONE ENCOUNTER
Future Appointments   Date Time Provider Department Center   9/6/2024  2:40 PM Maile Rivera, APRN - NP [facility]   12/18/2024  3:30 PM Hilario Cruz MD [facility]       Per, Maile Rivera, NIKOLAY - NP   \"Can you please call him and see if he was taking his metoprolol during the testing.  If he was not, please restart.  If he was I would like to increase it to metoprolol succinate/Toprol 75mg at night.    Primary rhythm was Sinus Rhythm. Average heart rate was 93 bpm  ·  Tachycardia 29% of the time\"    L/m for c/b

## 2024-08-20 ENCOUNTER — TELEPHONE (OUTPATIENT)
Age: 44
End: 2024-08-20

## 2024-09-04 NOTE — PROGRESS NOTES
Primary Cardiologist:  Castillo Lau MD. [facility]          Patient: Justyn Richard  : 1980      Today's Date: 2024        HISTORY OF PRESENT ILLNESS:     History of Present Illness:  Referred for tachycardia     Diastolic BP remains >80. Discussed need for better HR and BP control, HR now more in upper 80s-90s.    He feels fine - does not feel it racing.    Denies chest pain, edema, syncope, shortness of breath at rest, dyspnea on exertion, PND or orthopnea.  Has no tachycardia, palpitations or sense of arrhythmia.         PAST MEDICAL HISTORY:     Past Medical History:   Diagnosis Date    Dyslipidemia     Erectile dysfunction     Hypertension     Neuropathy     Type 2 diabetes mellitus without complication (HCC)            CURRENT MEDICATIONS:    .  Current Outpatient Medications   Medication Sig Dispense Refill    olmesartan (BENICAR) 40 MG tablet Take 1 tablet by mouth daily 90 tablet 3    metoprolol succinate (TOPROL XL) 100 MG extended release tablet Take 1 tablet by mouth daily 90 tablet 3    DULoxetine (CYMBALTA) 60 MG extended release capsule 1 cap(s) orally daily for 90 days      Continuous Glucose Sensor (FREESTYLE MALKA 3 SENSOR) [facility]       glimepiride (AMARYL) 2 MG tablet 1 tablet with breakfast or the first main meal of the day Orally Once a day for 90 days      metFORMIN (GLUCOPHAGE) 1000 MG tablet 1 tablet with a meal orally 2 times a day for 90 days      simvastatin (ZOCOR) 20 MG tablet Take 1 tablet by mouth nightly       No current facility-administered medications for this visit.       No Known Allergies      SOCIAL HISTORY:     Social History     Tobacco Use    Smoking status: Never     Passive exposure: Never    Smokeless tobacco: Never   Vaping Use    Vaping status: Never Used   Substance Use Topics    Alcohol use: No    Drug use: Never         FAMILY HISTORY:     Family History   Problem Relation Age of Onset    Diabetes Mother     Hypertension Mother     Hypertension Father

## 2024-09-06 ENCOUNTER — OFFICE VISIT (OUTPATIENT)
Age: 44
End: 2024-09-06

## 2024-09-06 VITALS
BODY MASS INDEX: 29.26 KG/M2 | HEART RATE: 85 BPM | WEIGHT: 216 LBS | SYSTOLIC BLOOD PRESSURE: 130 MMHG | HEIGHT: 72 IN | DIASTOLIC BLOOD PRESSURE: 90 MMHG | OXYGEN SATURATION: 98 %

## 2024-09-06 DIAGNOSIS — R00.0 SINUS TACHYCARDIA: Primary | ICD-10-CM

## 2024-09-06 DIAGNOSIS — E11.9 CONTROLLED TYPE 2 DIABETES MELLITUS WITHOUT COMPLICATION, WITHOUT LONG-TERM CURRENT USE OF INSULIN (HCC): ICD-10-CM

## 2024-09-06 DIAGNOSIS — I10 ESSENTIAL (PRIMARY) HYPERTENSION: ICD-10-CM

## 2024-09-06 DIAGNOSIS — E78.2 MIXED DYSLIPIDEMIA: ICD-10-CM

## 2024-09-06 RX ORDER — OLMESARTAN MEDOXOMIL 40 MG/1
40 TABLET ORAL DAILY
Qty: 90 TABLET | Refills: 3 | Status: SHIPPED | OUTPATIENT
Start: 2024-09-06

## 2024-09-06 RX ORDER — METOPROLOL SUCCINATE 100 MG/1
100 TABLET, EXTENDED RELEASE ORAL DAILY
Qty: 90 TABLET | Refills: 3 | Status: SHIPPED | OUTPATIENT
Start: 2024-09-06

## 2024-09-06 NOTE — PROGRESS NOTES
had concerns including Tachycardia and Hypertension.    Vitals:    09/06/24 1400   Height: 1.829 m (6')        Chest pain No    Refills No        1. Have you been to the ER, urgent care clinic since your last visit? No       Hospitalized since your last visit? No       Where?        Date?

## 2024-09-13 ENCOUNTER — TELEPHONE (OUTPATIENT)
Age: 44
End: 2024-09-13

## 2024-12-03 ENCOUNTER — TELEPHONE (OUTPATIENT)
Age: 44
End: 2024-12-03

## 2024-12-03 NOTE — TELEPHONE ENCOUNTER
Called pt @ [phone] to set up an appt due to the [employer] message. No answer. Left vm to return call.

## 2024-12-17 RX ORDER — OLMESARTAN MEDOXOMIL 40 MG/1
40 TABLET ORAL DAILY
Qty: 90 TABLET | Refills: 3 | Status: SHIPPED | OUTPATIENT
Start: 2024-12-17

## 2024-12-17 NOTE — TELEPHONE ENCOUNTER
Refill per VO of Maile Rivera NP  Last appt: 9/6/2024    Future Appointments   Date Time Provider Department Center   12/18/2024  3:30 PM Hilario Cruz MD [facility]   3/6/2025  3:20 PM Castillo Lau MD [facility]       Requested Prescriptions     Pending Prescriptions Disp Refills    olmesartan (BENICAR) 40 MG tablet 90 tablet 3     Sig: Take 1 tablet by mouth daily

## 2024-12-18 ENCOUNTER — OFFICE VISIT (OUTPATIENT)
Age: 44
End: 2024-12-18
Payer: COMMERCIAL

## 2024-12-18 VITALS
DIASTOLIC BLOOD PRESSURE: 98 MMHG | SYSTOLIC BLOOD PRESSURE: 133 MMHG | OXYGEN SATURATION: 97 % | WEIGHT: 222 LBS | HEIGHT: 72 IN | BODY MASS INDEX: 30.07 KG/M2 | RESPIRATION RATE: 16 BRPM | HEART RATE: 103 BPM | TEMPERATURE: 97.5 F

## 2024-12-18 DIAGNOSIS — E11.9 CONTROLLED TYPE 2 DIABETES MELLITUS WITHOUT COMPLICATION, WITHOUT LONG-TERM CURRENT USE OF INSULIN (HCC): ICD-10-CM

## 2024-12-18 DIAGNOSIS — I10 PRIMARY HYPERTENSION: ICD-10-CM

## 2024-12-18 DIAGNOSIS — R00.0 SINUS TACHYCARDIA: Primary | ICD-10-CM

## 2024-12-18 DIAGNOSIS — I47.10 SVT (SUPRAVENTRICULAR TACHYCARDIA) (HCC): ICD-10-CM

## 2024-12-18 DIAGNOSIS — E78.2 MIXED HYPERLIPIDEMIA: ICD-10-CM

## 2024-12-18 PROCEDURE — 3080F DIAST BP >= 90 MM HG: CPT | Performed by: INTERNAL MEDICINE

## 2024-12-18 PROCEDURE — 3075F SYST BP GE 130 - 139MM HG: CPT | Performed by: INTERNAL MEDICINE

## 2024-12-18 PROCEDURE — 3044F HG A1C LEVEL LT 7.0%: CPT | Performed by: INTERNAL MEDICINE

## 2024-12-18 PROCEDURE — 99214 OFFICE O/P EST MOD 30 MIN: CPT | Performed by: INTERNAL MEDICINE

## 2024-12-18 NOTE — PROGRESS NOTES
Justyn Richard is a 44 y.o. male  Chief Complaint   Patient presents with    6 Month Follow-Up       Vitals:    12/18/24 1524   BP: (!) 133/98   Pulse: (!) 103   Resp: 16   Temp: 97.5 °F (36.4 °C)   SpO2: 97%          HPI  Mr.Timothy Cruz Richard presents for a follow-up visit with no acute concerns. He has seen cardiology recently, and his heart rate is now better controlled. He also has a history of diabetes, with his most recent A1c being 7.4%. He follows up with endocrinology every month, although his blood sugar control still needs improvement. He will have labs drawn today for further evaluation. On this visit, the patient’s blood pressure is elevated, which he attributes to recent stress related to his daughter’s fall and sprained arm, for which he had to take her to the doctor. Despite this, his blood pressure should be better controlled. He is currently on an increased dose of olmesartan 40 mg and metoprolol 100 mg. The plan is to continue his current medication regimen, with an emphasis on maintaining a healthy diet and regular exercise.  Past Medical History:   Diagnosis Date    Dyslipidemia     Erectile dysfunction     Hypertension     Neuropathy     Type 2 diabetes mellitus without complication (HCC)             ROS  Review of Systems   Constitutional:  Negative for fever.   Respiratory:  Negative for cough and shortness of breath.    Cardiovascular:  Negative for chest pain and palpitations.   Neurological:  Negative for headaches.   Psychiatric/Behavioral:  Negative for dysphoric mood.            EXAM  Physical Exam  Vitals and nursing note reviewed.   HENT:      Head: Normocephalic and atraumatic.   Cardiovascular:      Rate and Rhythm: Normal rate and regular rhythm.      Pulses: Normal pulses.      Heart sounds: Normal heart sounds. No murmur heard.  Pulmonary:      Effort: Pulmonary effort is normal. No respiratory distress.      Breath sounds: Normal breath sounds.   Abdominal:

## 2024-12-18 NOTE — PROGRESS NOTES
I have reviewed all needed documentation in preparation for visit. Verified patient by name and date of birth  Chief Complaint   Patient presents with    6 Month Follow-Up       There were no vitals filed for this visit.    Health Maintenance Due   Topic Date Due    Pneumococcal 0-64 years Vaccine (1 of 2 - PCV) Never done    Diabetic foot exam  Never done    Lipids  Never done    Varicella vaccine (1 of 2 - 13+ 2-dose series) Never done    Diabetic Alb to Cr ratio (uACR) test  Never done    Diabetic retinal exam  Never done    Hepatitis B vaccine (1 of 3 - 19+ 3-dose series) Never done    DTaP/Tdap/Td vaccine (1 - Tdap) Never done    GFR test (Diabetes, CKD 3-4, OR last GFR 15-59)  06/28/2023    Flu vaccine (1) Never done    COVID-19 Vaccine (1 - 2023-24 season) Never done     \"Have you been to the ER, urgent care clinic since your last visit?  Hospitalized since your last visit?\"    NO    “Have you seen or consulted any other health care providers outside of [facility] since your last visit?”    NO            Click Here for Release of Records Request         Rebekah BRODY

## 2024-12-19 PROBLEM — E78.2 MIXED HYPERLIPIDEMIA: Status: ACTIVE | Noted: 2024-12-19

## 2024-12-19 LAB
ALBUMIN SERPL-MCNC: 4.4 G/DL (ref 3.5–5)
ALBUMIN/GLOB SERPL: 1.5 (ref 1.1–2.2)
ALP SERPL-CCNC: 63 U/L (ref 45–117)
ALT SERPL-CCNC: 23 U/L (ref 12–78)
ANION GAP SERPL CALC-SCNC: 5 MMOL/L (ref 2–12)
AST SERPL-CCNC: 11 U/L (ref 15–37)
BILIRUB SERPL-MCNC: 0.9 MG/DL (ref 0.2–1)
BUN SERPL-MCNC: 15 MG/DL (ref 6–20)
BUN/CREAT SERPL: 16 (ref 12–20)
CALCIUM SERPL-MCNC: 9.9 MG/DL (ref 8.5–10.1)
CHLORIDE SERPL-SCNC: 106 MMOL/L (ref 97–108)
CHOLEST SERPL-MCNC: 168 MG/DL
CO2 SERPL-SCNC: 28 MMOL/L (ref 21–32)
CREAT SERPL-MCNC: 0.94 MG/DL (ref 0.7–1.3)
ERYTHROCYTE [DISTWIDTH] IN BLOOD BY AUTOMATED COUNT: 12.6 % (ref 11.5–14.5)
EST. AVERAGE GLUCOSE BLD GHB EST-MCNC: 137 MG/DL
GLOBULIN SER CALC-MCNC: 3 G/DL (ref 2–4)
GLUCOSE SERPL-MCNC: 128 MG/DL (ref 65–100)
HBA1C MFR BLD: 6.4 % (ref 4–5.6)
HCT VFR BLD AUTO: 43.9 % (ref 36.6–50.3)
HDLC SERPL-MCNC: 33 MG/DL
HDLC SERPL: 5.1 (ref 0–5)
HGB BLD-MCNC: 15.2 G/DL (ref 12.1–17)
LDLC SERPL CALC-MCNC: 92.6 MG/DL (ref 0–100)
MCH RBC QN AUTO: 29.3 PG (ref 26–34)
MCHC RBC AUTO-ENTMCNC: 34.6 G/DL (ref 30–36.5)
MCV RBC AUTO: 84.6 FL (ref 80–99)
NRBC # BLD: 0 K/UL (ref 0–0.01)
NRBC BLD-RTO: 0 PER 100 WBC
PLATELET # BLD AUTO: 239 K/UL (ref 150–400)
PMV BLD AUTO: 11 FL (ref 8.9–12.9)
POTASSIUM SERPL-SCNC: 4 MMOL/L (ref 3.5–5.1)
PROT SERPL-MCNC: 7.4 G/DL (ref 6.4–8.2)
RBC # BLD AUTO: 5.19 M/UL (ref 4.1–5.7)
SODIUM SERPL-SCNC: 139 MMOL/L (ref 136–145)
TRIGL SERPL-MCNC: 212 MG/DL
VLDLC SERPL CALC-MCNC: 42.4 MG/DL
WBC # BLD AUTO: 8.1 K/UL (ref 4.1–11.1)

## 2024-12-19 ASSESSMENT — ENCOUNTER SYMPTOMS
COUGH: 0
SHORTNESS OF BREATH: 0

## 2024-12-23 RX ORDER — METOPROLOL SUCCINATE 100 MG/1
100 TABLET, EXTENDED RELEASE ORAL DAILY
Qty: 90 TABLET | Refills: 0 | Status: SHIPPED | OUTPATIENT
Start: 2024-12-23

## 2025-02-24 ENCOUNTER — TELEPHONE (OUTPATIENT)
Age: 45
End: 2025-02-24

## 2025-04-01 ENCOUNTER — OFFICE VISIT (OUTPATIENT)
Age: 45
End: 2025-04-01
Payer: COMMERCIAL

## 2025-04-01 VITALS
OXYGEN SATURATION: 98 % | BODY MASS INDEX: 29.39 KG/M2 | SYSTOLIC BLOOD PRESSURE: 138 MMHG | WEIGHT: 217 LBS | HEIGHT: 72 IN | HEART RATE: 88 BPM | DIASTOLIC BLOOD PRESSURE: 94 MMHG

## 2025-04-01 DIAGNOSIS — I10 PRIMARY HYPERTENSION: Primary | ICD-10-CM

## 2025-04-01 DIAGNOSIS — E78.2 MIXED HYPERLIPIDEMIA: ICD-10-CM

## 2025-04-01 DIAGNOSIS — E11.9 CONTROLLED TYPE 2 DIABETES MELLITUS WITHOUT COMPLICATION, WITHOUT LONG-TERM CURRENT USE OF INSULIN: ICD-10-CM

## 2025-04-01 PROCEDURE — 99214 OFFICE O/P EST MOD 30 MIN: CPT | Performed by: SPECIALIST

## 2025-04-01 PROCEDURE — 3080F DIAST BP >= 90 MM HG: CPT | Performed by: SPECIALIST

## 2025-04-01 PROCEDURE — 3075F SYST BP GE 130 - 139MM HG: CPT | Performed by: SPECIALIST

## 2025-04-01 RX ORDER — AMLODIPINE BESYLATE 2.5 MG/1
2.5 TABLET ORAL DAILY
Qty: 90 TABLET | Refills: 3 | Status: SHIPPED | OUTPATIENT
Start: 2025-04-01

## 2025-04-01 NOTE — PROGRESS NOTES
Chief Complaint   Patient presents with    Hypertension    Cholesterol Problem    Tachycardia     Vitals:    04/01/25 0923 04/01/25 0930   BP: (!) 138/94 (!) 138/94   Pulse: 88    SpO2: 98%    Weight: 98.4 kg (217 lb)    Height: 1.829 m (6')       BP (!) 138/94   Pulse 88   Ht 1.829 m (6')   Wt 98.4 kg (217 lb)   SpO2 98%   BMI 29.43 kg/m²

## 2025-04-01 NOTE — PATIENT INSTRUCTIONS
Patient Education        Learning About the Mediterranean Diet  What is the Mediterranean diet?     The Mediterranean diet is a style of eating rather than a diet plan. It features foods eaten in [place], [place], southern [place], and other countries along the [place] Sea. It emphasizes eating foods like fish, fruits, vegetables, beans, high-fiber breads and whole grains, nuts, and olive oil. This style of eating includes limited red meat, cheese, and sweets.  Why choose the Mediterranean diet?  A Mediterranean-style diet may improve heart health. It contains more fat than other heart-healthy diets. But the fats are mainly from nuts, unsaturated oils (such as fish oils and olive oil), and certain nut or seed oils (such as canola, soybean, or flaxseed oil). These fats may help protect the heart and blood vessels.  How can you get started on the Mediterranean diet?  Here are some things you can do to switch to a more Mediterranean way of eating.  What to eat  Eat a variety of fruits and vegetables each day, such as grapes, blueberries, tomatoes, broccoli, peppers, figs, olives, spinach, eggplant, beans, lentils, and chickpeas.  Eat a variety of whole-grain foods each day, such as oats, brown rice, and whole wheat bread, pasta, and couscous.  Eat fish at least 2 times a week. Try tuna, salmon, mackerel, lake trout, herring, or sardines.  Eat moderate amounts of low-fat dairy products, such as milk, cheese, or yogurt.  Eat moderate amounts of poultry and eggs.  Choose healthy (unsaturated) fats, such as nuts, olive oil, and certain nut or seed oils like canola, soybean, and flaxseed.  Limit unhealthy (saturated) fats, such as butter, palm oil, and coconut oil. And limit fats found in animal products, such as meat and dairy products made with whole milk. Try to eat red meat only a few times a month in very small amounts.  Limit sweets and desserts to only a few times a week. This includes sugar-sweetened

## 2025-04-01 NOTE — PROGRESS NOTES
Castillo Lau MD. [facility]          Patient: Justyn Richard  : 1980      Today's Date: 2025        HISTORY OF PRESENT ILLNESS:     History of Present Illness:  Referred for tachycardia   PCP noted on  \"Mr.Timothy Cruz Richard is a 43 y.o.male who has DMII, HTN who presents to clinic for follow up on HTN, sinus tachycardia. He started on metoprolol and feeling fine, his blood pressure is better and his heart rate is around 100. He is an EMT, he has a cardiology appointment. I think the EKG is sinus tachycardia on EKG, but given his recent EKG readings and his resting HR being over 130 before metoprolol,  I recommend a confirmation with Cardiology.   \"           He was off of his BP meds and BP and HR both were high - Toprol XL started then.   He says resting HR was 120 prior to Toprol.     Lately - He feels fine - does not feel it racing.    No CP or SOB.        PAST MEDICAL HISTORY:     Past Medical History:   Diagnosis Date    Dyslipidemia     Erectile dysfunction     Hypertension     Neuropathy     Sinus tachycardia     Type 2 diabetes mellitus without complication            CURRENT MEDICATIONS:    .  Current Outpatient Medications   Medication Sig Dispense Refill    metoprolol succinate (TOPROL XL) 100 MG extended release tablet Take 1 tablet by mouth daily 90 tablet 0    olmesartan (BENICAR) 40 MG tablet Take 1 tablet by mouth daily 90 tablet 3    DULoxetine (CYMBALTA) 60 MG extended release capsule 1 cap(s) orally daily for 90 days      glimepiride (AMARYL) 2 MG tablet 1 tablet with breakfast or the first main meal of the day Orally Once a day for 90 days      metFORMIN (GLUCOPHAGE) 1000 MG tablet Daily      simvastatin (ZOCOR) 20 MG tablet Take 1 tablet by mouth nightly      Continuous Glucose Sensor (FREESTYLE MALKA 3 SENSOR) MISC  (Patient not taking: Reported on 2025)       No current facility-administered medications for this visit.       No Known Allergies      SOCIAL HISTORY:

## 2025-05-12 RX ORDER — AMLODIPINE BESYLATE 2.5 MG/1
2.5 TABLET ORAL DAILY
Qty: 90 TABLET | Refills: 3 | Status: SHIPPED | OUTPATIENT
Start: 2025-05-12

## 2025-05-12 NOTE — TELEPHONE ENCOUNTER
Refill per VO of Dr. Lau  Last appt: 4/1/2025    Future Appointments   Date Time Provider Department Center   10/3/2025  9:00 AM Maile Rivera, APRN - NP [facility]       Requested Prescriptions     Signed Prescriptions Disp Refills    amLODIPine (NORVASC) 2.5 MG tablet 90 tablet 3     Sig: Take 1 tablet by mouth daily     Authorizing Provider: DRAGAN LAU     Ordering User: IJEOMA VAZQUEZ

## 2025-05-15 RX ORDER — METOPROLOL SUCCINATE 100 MG/1
100 TABLET, EXTENDED RELEASE ORAL DAILY
Qty: 90 TABLET | Refills: 3 | Status: SHIPPED | OUTPATIENT
Start: 2025-05-15

## 2025-05-15 NOTE — TELEPHONE ENCOUNTER
Refill per VO of Maile Rivera NP   Last appt: 9/6/2024    Future Appointments   Date Time Provider Department Center   10/3/2025  9:00 AM Maile Rivera, APRN - NP [facility]       Requested Prescriptions     Pending Prescriptions Disp Refills    metoprolol succinate (TOPROL XL) 100 MG extended release tablet 90 tablet 3     Sig: Take 1 tablet by mouth daily